# Patient Record
(demographics unavailable — no encounter records)

---

## 2025-03-25 NOTE — HISTORY OF PRESENT ILLNESS
[de-identified] : 9F here for initial evaluation.  For the past 3 weeks pt has had URI and is no better. She c/o nasal congestion, runny nose and right ear pain. Mom states pediatrician saw 'fluid in right ear' and was given ciprodex drops but ear pain still persists. No otorrhea, tinnitus or decrease in hearing. Also, within the past 3 days or so pt has developed new onset cough. Cough is nonproductive and constant. Pt has tried humidification and nasal sprays but cough persists. Pediatrician considering placing pt on inhaler but wanted her to see ENT first  ROS otherwise unremarkable

## 2025-03-25 NOTE — ASSESSMENT
[FreeTextEntry1] : 9F w nasal congestion, runny nose, mild otalgia and coughing w throat clearing. Most of the sx started during URI 3 weeks ago, but the coughing is only over the past few days. The cough is nonproductive and constant. Pt has tried humidification and nasal sprays but cough persists. Pediatrician considering placing pt on inhaler but wanted her to see ENT first One exam, there is coughing and some throat clearing t/o the encounter. Complete and comprehensive head and neck exam, including nasal endoscopy and fiberoptic laryngoscopy, is overall quite unremarkable. She is s/p adenotonsillectomy and there is no sinusitis or postnasal drip. Would favor cough to be reactive airway/postviral. There may be a mild bronchitis, though there is no sign of upper airway inflammation or infection. Discussed option of inhaled corticosteroid and albuterol as next step. Pt has appt with pediatrician today, they will discuss and will let me know.

## 2025-03-25 NOTE — PROCEDURE
[de-identified] : Nasal Endoscopy: nasal airways clear no masses/lesions no mucopus or polyps choana clear.    [FreeTextEntry3] : Nasal Endoscopy: nasal airways clear mild turbinate hypertrophy no mucopus or polyps choana clear s/p adenoidectomy, no drainage  Laryngoscopy: upper airway widely patent TVF fully mobile

## 2025-03-25 NOTE — CONSULT LETTER
[Dear  ___] : Dear  [unfilled], [Courtesy Letter:] : I had the pleasure of seeing your patient, [unfilled], in my office today. [Consult Closing:] : Thank you very much for allowing me to participate in the care of this patient.  If you have any questions, please do not hesitate to contact me. [Sincerely,] : Sincerely, [FreeTextEntry3] : Noah Bergeron MD Department of Otolaryngology, Head and Neck Surgery

## 2025-03-25 NOTE — PHYSICAL EXAM
[Midline] : trachea located in midline position [Removed] : palatine tonsils previously removed [Normal] : no rashes [de-identified] : eac clear, tm intact and mobile, me clear

## 2025-03-25 NOTE — PHYSICAL EXAM
[Midline] : trachea located in midline position [Removed] : palatine tonsils previously removed [Normal] : no rashes [de-identified] : eac clear, tm intact and mobile, me clear

## 2025-03-25 NOTE — PROCEDURE
[de-identified] : Nasal Endoscopy: nasal airways clear no masses/lesions no mucopus or polyps choana clear.    [FreeTextEntry3] : Nasal Endoscopy: nasal airways clear mild turbinate hypertrophy no mucopus or polyps choana clear s/p adenoidectomy, no drainage  Laryngoscopy: upper airway widely patent TVF fully mobile

## 2025-03-25 NOTE — HISTORY OF PRESENT ILLNESS
[de-identified] : 9F here for initial evaluation.  For the past 3 weeks pt has had URI and is no better. She c/o nasal congestion, runny nose and right ear pain. Mom states pediatrician saw 'fluid in right ear' and was given ciprodex drops but ear pain still persists. No otorrhea, tinnitus or decrease in hearing. Also, within the past 3 days or so pt has developed new onset cough. Cough is nonproductive and constant. Pt has tried humidification and nasal sprays but cough persists. Pediatrician considering placing pt on inhaler but wanted her to see ENT first  ROS otherwise unremarkable